# Patient Record
Sex: FEMALE | Race: WHITE | Employment: UNEMPLOYED | URBAN - METROPOLITAN AREA
[De-identification: names, ages, dates, MRNs, and addresses within clinical notes are randomized per-mention and may not be internally consistent; named-entity substitution may affect disease eponyms.]

---

## 2017-10-24 ENCOUNTER — TELEPHONE (OUTPATIENT)
Dept: INTERNAL MEDICINE CLINIC | Age: 42
End: 2017-10-24

## 2017-10-24 NOTE — TELEPHONE ENCOUNTER
Received request from Daniel Sun   - faxed to [facility] for processing on 10/24/17. For status update, call [phone] option 1.

## 2020-07-15 ENCOUNTER — HOSPITAL ENCOUNTER (EMERGENCY)
Age: 45
Discharge: PSYCHIATRIC OR SUBSTANCE USE FACILITY | End: 2020-07-16
Attending: EMERGENCY MEDICINE
Payer: MEDICARE

## 2020-07-15 LAB
ACETAMINOPHEN LEVEL: <5 UG/ML (ref 10–30)
ANION GAP SERPL CALCULATED.3IONS-SCNC: 10 MMOL/L (ref 3–16)
BACTERIA: ABNORMAL /HPF
BASOPHILS ABSOLUTE: 0 K/UL (ref 0–0.2)
BASOPHILS RELATIVE PERCENT: 0.7 %
BILIRUBIN URINE: ABNORMAL
BLOOD, URINE: ABNORMAL
BUN BLDV-MCNC: 13 MG/DL (ref 7–20)
CALCIUM SERPL-MCNC: 9.4 MG/DL (ref 8.3–10.6)
CHLORIDE BLD-SCNC: 106 MMOL/L (ref 99–110)
CLARITY: ABNORMAL
CO2: 24 MMOL/L (ref 21–32)
COLOR: YELLOW
CREAT SERPL-MCNC: 0.5 MG/DL (ref 0.6–1.1)
EOSINOPHILS ABSOLUTE: 0 K/UL (ref 0–0.6)
EOSINOPHILS RELATIVE PERCENT: 0.1 %
EPITHELIAL CELLS, UA: ABNORMAL /HPF (ref 0–5)
ETHANOL: NORMAL MG/DL (ref 0–0.08)
GFR AFRICAN AMERICAN: >60
GFR NON-AFRICAN AMERICAN: >60
GLUCOSE BLD-MCNC: 101 MG/DL (ref 70–99)
GLUCOSE URINE: NEGATIVE MG/DL
HCG(URINE) PREGNANCY TEST: NEGATIVE
HCT VFR BLD CALC: 39.2 % (ref 36–48)
HEMOGLOBIN: 13.4 G/DL (ref 12–16)
KETONES, URINE: 15 MG/DL
LEUKOCYTE ESTERASE, URINE: ABNORMAL
LYMPHOCYTES ABSOLUTE: 1.4 K/UL (ref 1–5.1)
LYMPHOCYTES RELATIVE PERCENT: 25.7 %
MCH RBC QN AUTO: 31.6 PG (ref 26–34)
MCHC RBC AUTO-ENTMCNC: 34.2 G/DL (ref 31–36)
MCV RBC AUTO: 92.5 FL (ref 80–100)
MICROSCOPIC EXAMINATION: YES
MONOCYTES ABSOLUTE: 0.5 K/UL (ref 0–1.3)
MONOCYTES RELATIVE PERCENT: 9.7 %
NEUTROPHILS ABSOLUTE: 3.4 K/UL (ref 1.7–7.7)
NEUTROPHILS RELATIVE PERCENT: 63.8 %
NITRITE, URINE: POSITIVE
PDW BLD-RTO: 13.5 % (ref 12.4–15.4)
PH UA: 6 (ref 5–8)
PLATELET # BLD: 353 K/UL (ref 135–450)
PMV BLD AUTO: 8.5 FL (ref 5–10.5)
POTASSIUM REFLEX MAGNESIUM: 4.2 MMOL/L (ref 3.5–5.1)
PROTEIN UA: 100 MG/DL
RBC # BLD: 4.24 M/UL (ref 4–5.2)
RBC UA: ABNORMAL /HPF (ref 0–4)
SALICYLATE, SERUM: 0.8 MG/DL (ref 15–30)
SODIUM BLD-SCNC: 140 MMOL/L (ref 136–145)
SPECIFIC GRAVITY UA: >=1.03 (ref 1–1.03)
URINE TYPE: ABNORMAL
UROBILINOGEN, URINE: 1 E.U./DL
WBC # BLD: 5.4 K/UL (ref 4–11)
WBC UA: ABNORMAL /HPF (ref 0–5)

## 2020-07-15 PROCEDURE — G0480 DRUG TEST DEF 1-7 CLASSES: HCPCS

## 2020-07-15 PROCEDURE — 99284 EMERGENCY DEPT VISIT MOD MDM: CPT

## 2020-07-15 PROCEDURE — 80048 BASIC METABOLIC PNL TOTAL CA: CPT

## 2020-07-15 PROCEDURE — 84703 CHORIONIC GONADOTROPIN ASSAY: CPT

## 2020-07-15 PROCEDURE — 6370000000 HC RX 637 (ALT 250 FOR IP): Performed by: PHYSICIAN ASSISTANT

## 2020-07-15 PROCEDURE — 85025 COMPLETE CBC W/AUTO DIFF WBC: CPT

## 2020-07-15 PROCEDURE — 87077 CULTURE AEROBIC IDENTIFY: CPT

## 2020-07-15 PROCEDURE — 87186 SC STD MICRODIL/AGAR DIL: CPT

## 2020-07-15 PROCEDURE — 81001 URINALYSIS AUTO W/SCOPE: CPT

## 2020-07-15 PROCEDURE — 6370000000 HC RX 637 (ALT 250 FOR IP): Performed by: STUDENT IN AN ORGANIZED HEALTH CARE EDUCATION/TRAINING PROGRAM

## 2020-07-15 PROCEDURE — 87086 URINE CULTURE/COLONY COUNT: CPT

## 2020-07-15 RX ORDER — CEPHALEXIN 500 MG/1
500 CAPSULE ORAL 2 TIMES DAILY
Qty: 6 CAPSULE | Refills: 0 | Status: SHIPPED | OUTPATIENT
Start: 2020-07-15 | End: 2020-07-18

## 2020-07-15 RX ORDER — CEPHALEXIN 500 MG/1
500 CAPSULE ORAL ONCE
Status: COMPLETED | OUTPATIENT
Start: 2020-07-15 | End: 2020-07-15

## 2020-07-15 RX ORDER — HYDROXYZINE PAMOATE 25 MG/1
50 CAPSULE ORAL ONCE
Status: COMPLETED | OUTPATIENT
Start: 2020-07-15 | End: 2020-07-15

## 2020-07-15 RX ADMIN — CEPHALEXIN 500 MG: 500 CAPSULE ORAL at 21:28

## 2020-07-15 RX ADMIN — HYDROXYZINE PAMOATE 50 MG: 25 CAPSULE ORAL at 17:15

## 2020-07-15 RX ADMIN — RIVAROXABAN 15 MG: 15 TABLET, FILM COATED ORAL at 19:34

## 2020-07-15 ASSESSMENT — ENCOUNTER SYMPTOMS
VOMITING: 0
ABDOMINAL PAIN: 0
COUGH: 0
RESPIRATORY NEGATIVE: 1
SHORTNESS OF BREATH: 0
GASTROINTESTINAL NEGATIVE: 1
DIARRHEA: 0

## 2020-07-15 NOTE — ED TRIAGE NOTES
Pt was brought to the ED after being found running on the street. Pt states that she didn't feel safe at home because the 'zombie apocalypse' is starting on Wednesday. Pt admits to being bipolar and states that she ran out of her meds last Wednesday.

## 2020-07-15 NOTE — ED PROVIDER NOTES
[facility]  EMERGENCY DEPARTMENT ENCOUNTER          PHYSICIAN ASSISTANT NOTE       Date of evaluation: 7/15/2020    Chief Complaint     Panic Attack      History of Present Illness     Lety Sanchez is a 39 y.o. female who presents for panic attack. Patient with pressured speech and disorientation to time, so HPI is somewhat limited. Patient reports last week, she thinks on Wednesday, she had an \"episode\" in which she woke up thinking it was the apocalypse and was running through the street when [employer]" picked her up and let her stay with him for \"a while. \" Patient reports this man did not harm her in any way. Patient reports she has not been taking any of her medicine since she ran away. Patient reports she is supposed to be on vistaril because she was tapered off klonopin. Also reports she is supposed to be on Xarelto for a PE. Patient denies any chest pain or shortness of breath. No SI/HI. On chart review, patient was tapered off of her medicine after an intentional overdose which patient confirms. Patient denies taking anything in several days. Review of Systems     Review of Systems   Constitutional: Negative. Negative for fever. Respiratory: Negative. Negative for cough and shortness of breath. Cardiovascular: Negative. Negative for chest pain. Gastrointestinal: Negative. Negative for abdominal pain, diarrhea and vomiting. Musculoskeletal: Negative. Skin: Negative. Neurological: Negative. Psychiatric/Behavioral: The patient is nervous/anxious. All other systems reviewed and are negative. Past Medical, Surgical, Family, and Social History     She has a past medical history of Bilateral dry eyes, Bipolar 1 disorder ([address] Social anxiety disorder, and Varicose veins. She has a past surgical history that includes Vein Surgery (2010) and cyst removal.  Her family history is not on file. She reports that she has never smoked.  She has never used smokeless Neutrophils Absolute 3.4 1.7 - 7.7 K/uL    Lymphocytes Absolute 1.4 1.0 - 5.1 K/uL    Monocytes Absolute 0.5 0.0 - 1.3 K/uL    Eosinophils Absolute 0.0 0.0 - 0.6 K/uL    Basophils Absolute 0.0 0.0 - 0.2 K/uL   Basic Metabolic Panel w/ Reflex to MG   Result Value Ref Range    Sodium 140 136 - 145 mmol/L    Potassium reflex Magnesium 4.2 3.5 - 5.1 mmol/L    Chloride 106 99 - 110 mmol/L    CO2 24 21 - 32 mmol/L    Anion Gap 10 3 - 16    Glucose 101 (H) 70 - 99 mg/dL    BUN 13 7 - 20 mg/dL    CREATININE 0.5 (L) 0.6 - 1.1 mg/dL    GFR Non-African American >60 >60    GFR African American >60 >60    Calcium 9.4 8.3 - 10.6 mg/dL   Ethanol   Result Value Ref Range    Ethanol Lvl None Detected mg/dL   Acetaminophen level   Result Value Ref Range    Acetaminophen Level <5 (L) 10 - 30 ug/mL   Salicylate   Result Value Ref Range    Salicylate, Serum 0.8 (L) 15.0 - 30.0 mg/dL       ED BEDSIDE ULTRASOUND:      RECENT VITALS:  BP: 121/81, Temp: 98.8 °F (37.1 °C), Pulse: 89, Resp: 16, SpO2: 98 %     Procedures         ED Course     Nursing Notes, Past Medical Hx,Past Surgical Hx, Social Hx, Allergies, and Family Hx were reviewed. The patient was given the following medications:  Orders Placed This Encounter   Medications    hydrOXYzine (VISTARIL) capsule 50 mg       CONSULTS:  None    MEDICAL DECISION MAKING / ASSESSMENT / Jyotsna Chantal is a 39 y.o. female with anxiety. Patient is anxious but cooperative in the ER. Vitals are normal.  Patient is disoriented to the time, cannot give a good timeline for recent events. Patient has no meningismus and no focal neurologic deficits. I suspect this is due to decompensated bipolar disorder, currently manic. Labs are reassuring with normal blood counts and renal function. Tylenol and ethanol is undetectable, aspirin is within normal limits. Patient will be restarted on her Xarelto for history of PE.   Patient with no chest pain, shortness of breath, tachycardia, hypoxia to indicate further work-up. A psychiatric hold was placed for the patient safety as she has been exhibiting dangerous behavior recently likely due to manic episode. Urinalysis pending. Once urinalysis is complete, the patient will be transferred to a psychiatric facility. This patient was also evaluated by the attending physician. All care plans were discussed and agreed upon. Clinical Impression     1. Manic episode ([address]        Disposition     PATIENT REFERRED TO:  No follow-up provider specified.     DISCHARGE MEDICATIONS:  New Prescriptions    No medications on file       DISPOSITION  pending        Boy Johns PA-C  07/15/20 [id number]

## 2020-07-15 NOTE — ED PROVIDER NOTES
[facility] RESIDENT NOTE     Date of evaluation: 7/15/2020    ADDENDUM:      Care of this patient was assumed from [place], [place]. The patient was seen for Panic Attack  . The patient's initial evaluation and plan have been discussed with the prior provider who initially evaluated the patient. Nursing Notes, Past Medical Hx, Past Surgical Hx, Social Hx, Allergies, and Family Hx were all reviewed.     Diagnostic Results       RADIOLOGY:  No orders to display       LABS:   Results for orders placed or performed during the hospital encounter of 07/15/20   CBC Auto Differential   Result Value Ref Range    WBC 5.4 4.0 - 11.0 K/uL    RBC 4.24 4.00 - 5.20 M/uL    Hemoglobin 13.4 12.0 - 16.0 g/dL    Hematocrit 39.2 36.0 - 48.0 %    MCV 92.5 80.0 - 100.0 fL    MCH 31.6 26.0 - 34.0 pg    MCHC 34.2 31.0 - 36.0 g/dL    RDW 13.5 12.4 - 15.4 %    Platelets 784 342 - 409 K/uL    MPV 8.5 5.0 - 10.5 fL    Neutrophils % 63.8 %    Lymphocytes % 25.7 %    Monocytes % 9.7 %    Eosinophils % 0.1 %    Basophils % 0.7 %    Neutrophils Absolute 3.4 1.7 - 7.7 K/uL    Lymphocytes Absolute 1.4 1.0 - 5.1 K/uL    Monocytes Absolute 0.5 0.0 - 1.3 K/uL    Eosinophils Absolute 0.0 0.0 - 0.6 K/uL    Basophils Absolute 0.0 0.0 - 0.2 K/uL   Basic Metabolic Panel w/ Reflex to MG   Result Value Ref Range    Sodium 140 136 - 145 mmol/L    Potassium reflex Magnesium 4.2 3.5 - 5.1 mmol/L    Chloride 106 99 - 110 mmol/L    CO2 24 21 - 32 mmol/L    Anion Gap 10 3 - 16    Glucose 101 (H) 70 - 99 mg/dL    BUN 13 7 - 20 mg/dL    CREATININE 0.5 (L) 0.6 - 1.1 mg/dL    GFR Non-African American >60 >60    GFR African American >60 >60    Calcium 9.4 8.3 - 10.6 mg/dL   Urinalysis, reflex to microscopic   Result Value Ref Range    Color, UA Yellow Straw/Yellow    Clarity, UA CLOUDY (A) Clear    Glucose, Ur Negative Negative mg/dL    Bilirubin Urine SMALL (A) Negative    Ketones, Urine 15 (A) Negative mg/dL Specific Gravity, UA >=1.030 1.005 - 1.030    Blood, Urine LARGE (A) Negative    pH, UA 6.0 5.0 - 8.0    Protein,  (A) Negative mg/dL    Urobilinogen, Urine 1.0 <2.0 E.U./dL    Nitrite, Urine POSITIVE (A) Negative    Leukocyte Esterase, Urine TRACE (A) Negative    Microscopic Examination YES     Urine Type Voided    Pregnancy, Urine   Result Value Ref Range    HCG(Urine) Pregnancy Test Negative Detects HCG level >20 MIU/mL   Ethanol   Result Value Ref Range    Ethanol Lvl None Detected mg/dL   Acetaminophen level   Result Value Ref Range    Acetaminophen Level <5 (L) 10 - 30 ug/mL   Salicylate   Result Value Ref Range    Salicylate, Serum 0.8 (L) 15.0 - 30.0 mg/dL   Microscopic Urinalysis   Result Value Ref Range    WBC, UA 21-50 (A) 0 - 5 /HPF    RBC, UA 21-50 (A) 0 - 4 /HPF    Epithelial Cells, UA 6-10 (A) 0 - 5 /HPF    Bacteria, UA 4+ (A) None Seen /HPF       RECENT VITALS:  BP: 113/73, Temp: 98.8 °F (37.1 °C), Pulse: 96, Resp: 16, SpO2: 100 %     Procedures         ED Course     The patient was given the following medications:  Orders Placed This Encounter   Medications    hydrOXYzine (VISTARIL) capsule 50 mg    rivaroxaban (XARELTO) tablet 15 mg    cephALEXin (KEFLEX) capsule 500 mg    cephALEXin (KEFLEX) 500 MG capsule     Sig: Take 1 capsule by mouth 2 times daily for 3 days     Dispense:  6 capsule     Refill:  0       CONSULTS:  None    MEDICAL DECISION MAKING / ASSESSMENT / Olya Tolbert is a 39 y.o. female with history of mood disorder who presents for psych eval. On psych hol and patient pending UA for medical clearance. UA was nitrite positive with 25-50 WBCs and 4+ bacteria. Culture was sent and she was given dose of keflex for possible urinary tract infection. Urine pregnancy test was negative. Patient medically cleared. She will continued to be monitored in the ED until transferred to psych facility. This patient was also evaluated by the attending physician.  All care plans were discussed and agreed upon. Clinical Impression     1. Manic episode ([address]    2. Cystitis        Disposition     PATIENT REFERRED TO:  No follow-up provider specified.     DISCHARGE MEDICATIONS:  New Prescriptions    CEPHALEXIN (KEFLEX) 500 MG CAPSULE    Take 1 capsule by mouth 2 times daily for 3 days       DISPOSITION Decision To Transfer 07/15/2020 09:20:39 PM         Mi Vitale MD  Resident  07/15/20 [id number]

## 2020-07-15 NOTE — ED NOTES
Pt states we can call her mother, Jackie Ron [phone].  Tried to call and line is busy     Galindo Barrett RN  07/15/20 [phone]

## 2020-07-15 NOTE — ED PROVIDER NOTES
ED Attending Attestation Note     Date of evaluation: 7/15/2020    This patient was seen by the JOSSUE. I have seen and examined the patient, agree with the workup, evaluation, management and diagnosis. The care plan has been discussed. I have reviewed the ECG and concur with the resident's interpretation. I was present for any procedures performed in the JOSSUE's note and have made edits to the note where appropriate. My assessment reveals 39 y.o. female with history of bipolar 1 disorder presenting for psychiatric evaluation. Patient has been off her medications for an unclear period of time but within approximately the last week she has been having more difficulty with her thought process and feelings of safety at home. She has very tangential thoughts and frequently puts the other sentences that do not make sense. She talks about \"seeing through my own eyes\" and \"the apocalypse\" quite frequently. Feel she likely is in early stages of manic phase of her bipolar disorder with some psychosis. No clear auditory or visual hallucinations during the interview. Denies suicidal or homicidal ideation. Will obtain labs for medical clearance. Anticipate transfer for psychiatric evaluation.        Heidi Shine MD  07/15/20 [id number]

## 2020-07-16 VITALS
HEART RATE: 102 BPM | HEIGHT: 63 IN | RESPIRATION RATE: 16 BRPM | WEIGHT: 122.7 LBS | SYSTOLIC BLOOD PRESSURE: 120 MMHG | OXYGEN SATURATION: 98 % | BODY MASS INDEX: 21.74 KG/M2 | DIASTOLIC BLOOD PRESSURE: 70 MMHG | TEMPERATURE: 98.8 F

## 2020-07-16 NOTE — FLOWSHEET NOTE
40 y/o F admitted to ED for an anxiety attack. Pt was found running down a local road without shoes worried about an impending zombie apocalypse. Pt admits to be out of her medication for at least a week. Pt has been calm and complaint free while here in the ED. She denies SI and HI.

## 2020-07-16 NOTE — ED NOTES
.Patient prepared for discharge and return to extended care facility. Discharge instructions given to transport crew after report called to Benson Schlatter at [facility]. All verbalized understanding of discharge instructions and return to extended care facility.       Alexandr Turcios RN  07/16/20 [id number]

## 2020-07-17 LAB
ORGANISM: ABNORMAL
URINE CULTURE, ROUTINE: ABNORMAL
URINE CULTURE, ROUTINE: ABNORMAL